# Patient Record
(demographics unavailable — no encounter records)

---

## 2024-12-06 NOTE — HISTORY OF PRESENT ILLNESS
[Never] : never [TextBox_4] : Ms. NICHOLAS PORTER is a 41 year old woman with no significant medical history is here for evaluation  Was seen once in 2020 for SOB. PFTs and CXR at the time were normal. Sx have since resolved.    Mid Nov - was dx with Flu A and possible PnA. Treated with Azithro. She felt better but notes lingering cough. Has mild chest discomfort with coughing. No fevers, no chills. No wheezing, no SOB.  CXR 11/16/24 with clear lungs .    ROS: denies fevers, chills, night sweats, unintentional weight loss   PMH: large endometriotic cyst of the left ovary, s/p LSO (2018), Csection x 2 Meds: per chart All: NKDA SH: works at [employer] (), never smoker, some second hand some exposure FH: Denies family hx of pulmonary disease PMD: VERNELL BUENO

## 2024-12-06 NOTE — CONSULT LETTER
[Dear  ___] : Dear  [unfilled], [Consult Letter:] : I had the pleasure of evaluating your patient, [unfilled]. [Please see my note below.] : Please see my note below. [Consult Closing:] : Thank you very much for allowing me to participate in the care of this patient.  If you have any questions, please do not hesitate to contact me. [FreeTextEntry3] : Sincerely,  Amirah Luis MD [facility] tel: [phone] fax: [phone]

## 2024-12-06 NOTE — ASSESSMENT
[FreeTextEntry1] :  Ms. NICHOLAS PORTER is a 41 year old woman with no significant medical history is here for evaluation  #cough -mild cough after recent FluA infection.  Lungs are clear on exam today.  Spirometry normal.  Discussed that cough may persist for up to 6 weeks.  Advised to follow-up with me if she has worsening cough, fevers, shortness of breath or wheezing.   f/u prn